# Patient Record
Sex: MALE | Race: WHITE | URBAN - METROPOLITAN AREA
[De-identification: names, ages, dates, MRNs, and addresses within clinical notes are randomized per-mention and may not be internally consistent; named-entity substitution may affect disease eponyms.]

---

## 2017-10-30 RX ORDER — TRIAMTERENE/HYDROCHLOROTHIAZID 37.5-25 MG
1 TABLET ORAL DAILY
COMMUNITY

## 2017-11-06 ENCOUNTER — HOSPITAL ENCOUNTER (OUTPATIENT)
Facility: CLINIC | Age: 67
Discharge: HOME | End: 2017-11-06
Attending: ORTHOPAEDIC SURGERY | Admitting: ORTHOPAEDIC SURGERY
Payer: MEDICARE

## 2017-11-06 ENCOUNTER — ANESTHESIA EVENT (OUTPATIENT)
Dept: SURGERY | Facility: CLINIC | Age: 67
End: 2017-11-06
Payer: MEDICARE

## 2017-11-06 ENCOUNTER — ANESTHESIA (OUTPATIENT)
Dept: SURGERY | Facility: CLINIC | Age: 67
End: 2017-11-06
Payer: MEDICARE

## 2017-11-06 ENCOUNTER — APPOINTMENT (OUTPATIENT)
Dept: GENERAL RADIOLOGY | Facility: CLINIC | Age: 67
End: 2017-11-06
Attending: ORTHOPAEDIC SURGERY
Payer: MEDICARE

## 2017-11-06 VITALS
HEIGHT: 69 IN | OXYGEN SATURATION: 97 % | SYSTOLIC BLOOD PRESSURE: 117 MMHG | BODY MASS INDEX: 43.56 KG/M2 | DIASTOLIC BLOOD PRESSURE: 74 MMHG | RESPIRATION RATE: 16 BRPM | WEIGHT: 294.1 LBS | TEMPERATURE: 97.1 F

## 2017-11-06 DIAGNOSIS — Z98.890 S/P FOOT SURGERY, LEFT: Primary | ICD-10-CM

## 2017-11-06 PROCEDURE — 27210794 ZZH OR GENERAL SUPPLY STERILE: Performed by: ORTHOPAEDIC SURGERY

## 2017-11-06 PROCEDURE — 25000128 H RX IP 250 OP 636: Performed by: ORTHOPAEDIC SURGERY

## 2017-11-06 PROCEDURE — 37000008 ZZH ANESTHESIA TECHNICAL FEE, 1ST 30 MIN: Performed by: ORTHOPAEDIC SURGERY

## 2017-11-06 PROCEDURE — 36000058 ZZH SURGERY LEVEL 3 EA 15 ADDTL MIN: Performed by: ORTHOPAEDIC SURGERY

## 2017-11-06 PROCEDURE — 36000060 ZZH SURGERY LEVEL 3 W FLUORO 1ST 30 MIN: Performed by: ORTHOPAEDIC SURGERY

## 2017-11-06 PROCEDURE — 25000132 ZZH RX MED GY IP 250 OP 250 PS 637: Mod: GY | Performed by: PHYSICIAN ASSISTANT

## 2017-11-06 PROCEDURE — C1713 ANCHOR/SCREW BN/BN,TIS/BN: HCPCS | Performed by: ORTHOPAEDIC SURGERY

## 2017-11-06 PROCEDURE — 25000128 H RX IP 250 OP 636: Performed by: NURSE ANESTHETIST, CERTIFIED REGISTERED

## 2017-11-06 PROCEDURE — 27110028 ZZH OR GENERAL SUPPLY NON-STERILE: Performed by: ORTHOPAEDIC SURGERY

## 2017-11-06 PROCEDURE — 25000128 H RX IP 250 OP 636: Performed by: ANESTHESIOLOGY

## 2017-11-06 PROCEDURE — 71000012 ZZH RECOVERY PHASE 1 LEVEL 1 FIRST HR: Performed by: ORTHOPAEDIC SURGERY

## 2017-11-06 PROCEDURE — 40000277 XR SURGERY CARM FLUORO LESS THAN 5 MIN W STILLS

## 2017-11-06 PROCEDURE — 25000125 ZZHC RX 250: Performed by: NURSE ANESTHETIST, CERTIFIED REGISTERED

## 2017-11-06 PROCEDURE — C1769 GUIDE WIRE: HCPCS | Performed by: ORTHOPAEDIC SURGERY

## 2017-11-06 PROCEDURE — 71000013 ZZH RECOVERY PHASE 1 LEVEL 1 EA ADDTL HR: Performed by: ORTHOPAEDIC SURGERY

## 2017-11-06 PROCEDURE — A9270 NON-COVERED ITEM OR SERVICE: HCPCS | Mod: GY | Performed by: PHYSICIAN ASSISTANT

## 2017-11-06 PROCEDURE — 25000566 ZZH SEVOFLURANE, EA 15 MIN: Performed by: ORTHOPAEDIC SURGERY

## 2017-11-06 PROCEDURE — 37000009 ZZH ANESTHESIA TECHNICAL FEE, EACH ADDTL 15 MIN: Performed by: ORTHOPAEDIC SURGERY

## 2017-11-06 PROCEDURE — 71000027 ZZH RECOVERY PHASE 2 EACH 15 MINS: Performed by: ORTHOPAEDIC SURGERY

## 2017-11-06 PROCEDURE — 40000170 ZZH STATISTIC PRE-PROCEDURE ASSESSMENT II: Performed by: ORTHOPAEDIC SURGERY

## 2017-11-06 DEVICE — IMP SCR ARTHREX CAN 4.0X30MM AR-8940-30: Type: IMPLANTABLE DEVICE | Status: FUNCTIONAL

## 2017-11-06 DEVICE — IMP SCR ARTHREX CAN LP 6.7X45MM 18MM THRD AR-8967-1845: Type: IMPLANTABLE DEVICE | Site: ANKLE | Status: FUNCTIONAL

## 2017-11-06 DEVICE — IMP PLATE ARTHREX MIDFOOT FLAT H SM LT AR-8942L-S: Type: IMPLANTABLE DEVICE | Site: ANKLE | Status: FUNCTIONAL

## 2017-11-06 DEVICE — IMP SCR ARTHREX BLUE LOCKING 3.5X26MM AR-8935L-26: Type: IMPLANTABLE DEVICE | Site: ANKLE | Status: FUNCTIONAL

## 2017-11-06 RX ORDER — ONDANSETRON 2 MG/ML
4 INJECTION INTRAMUSCULAR; INTRAVENOUS EVERY 30 MIN PRN
Status: DISCONTINUED | OUTPATIENT
Start: 2017-11-06 | End: 2017-11-06 | Stop reason: HOSPADM

## 2017-11-06 RX ORDER — AMOXICILLIN 250 MG
1-2 CAPSULE ORAL 2 TIMES DAILY
Qty: 30 TABLET | Refills: 0 | Status: SHIPPED | OUTPATIENT
Start: 2017-11-06

## 2017-11-06 RX ORDER — LIDOCAINE HYDROCHLORIDE 20 MG/ML
INJECTION, SOLUTION INFILTRATION; PERINEURAL PRN
Status: DISCONTINUED | OUTPATIENT
Start: 2017-11-06 | End: 2017-11-06

## 2017-11-06 RX ORDER — ROPIVACAINE HYDROCHLORIDE 5 MG/ML
INJECTION, SOLUTION EPIDURAL; INFILTRATION; PERINEURAL PRN
Status: DISCONTINUED | OUTPATIENT
Start: 2017-11-06 | End: 2017-11-06

## 2017-11-06 RX ORDER — PROPOFOL 10 MG/ML
INJECTION, EMULSION INTRAVENOUS CONTINUOUS PRN
Status: DISCONTINUED | OUTPATIENT
Start: 2017-11-06 | End: 2017-11-06

## 2017-11-06 RX ORDER — FENTANYL CITRATE 50 UG/ML
25-50 INJECTION, SOLUTION INTRAMUSCULAR; INTRAVENOUS EVERY 5 MIN PRN
Status: DISCONTINUED | OUTPATIENT
Start: 2017-11-06 | End: 2017-11-06 | Stop reason: HOSPADM

## 2017-11-06 RX ORDER — PHYSOSTIGMINE SALICYLATE 1 MG/ML
1.2 INJECTION INTRAVENOUS
Status: DISCONTINUED | OUTPATIENT
Start: 2017-11-06 | End: 2017-11-06 | Stop reason: HOSPADM

## 2017-11-06 RX ORDER — ONDANSETRON 2 MG/ML
INJECTION INTRAMUSCULAR; INTRAVENOUS PRN
Status: DISCONTINUED | OUTPATIENT
Start: 2017-11-06 | End: 2017-11-06

## 2017-11-06 RX ORDER — CEFAZOLIN SODIUM 1 G/50ML
3 SOLUTION INTRAVENOUS
Status: COMPLETED | OUTPATIENT
Start: 2017-11-06 | End: 2017-11-06

## 2017-11-06 RX ORDER — OXYCODONE AND ACETAMINOPHEN 5; 325 MG/1; MG/1
1-2 TABLET ORAL
Status: COMPLETED | OUTPATIENT
Start: 2017-11-06 | End: 2017-11-06

## 2017-11-06 RX ORDER — CEFAZOLIN SODIUM 1 G/3ML
1 INJECTION, POWDER, FOR SOLUTION INTRAMUSCULAR; INTRAVENOUS SEE ADMIN INSTRUCTIONS
Status: DISCONTINUED | OUTPATIENT
Start: 2017-11-06 | End: 2017-11-06 | Stop reason: HOSPADM

## 2017-11-06 RX ORDER — CEFAZOLIN SODIUM 2 G/100ML
2 INJECTION, SOLUTION INTRAVENOUS
Status: DISCONTINUED | OUTPATIENT
Start: 2017-11-06 | End: 2017-11-06 | Stop reason: DRUGHIGH

## 2017-11-06 RX ORDER — FENTANYL CITRATE 50 UG/ML
25-50 INJECTION, SOLUTION INTRAMUSCULAR; INTRAVENOUS
Status: DISCONTINUED | OUTPATIENT
Start: 2017-11-06 | End: 2017-11-06 | Stop reason: HOSPADM

## 2017-11-06 RX ORDER — PROPOFOL 10 MG/ML
INJECTION, EMULSION INTRAVENOUS PRN
Status: DISCONTINUED | OUTPATIENT
Start: 2017-11-06 | End: 2017-11-06

## 2017-11-06 RX ORDER — FENTANYL CITRATE 50 UG/ML
INJECTION, SOLUTION INTRAMUSCULAR; INTRAVENOUS PRN
Status: DISCONTINUED | OUTPATIENT
Start: 2017-11-06 | End: 2017-11-06

## 2017-11-06 RX ORDER — ONDANSETRON 4 MG/1
4 TABLET, ORALLY DISINTEGRATING ORAL
Status: DISCONTINUED | OUTPATIENT
Start: 2017-11-06 | End: 2017-11-06 | Stop reason: HOSPADM

## 2017-11-06 RX ORDER — ONDANSETRON 4 MG/1
4 TABLET, ORALLY DISINTEGRATING ORAL EVERY 30 MIN PRN
Status: DISCONTINUED | OUTPATIENT
Start: 2017-11-06 | End: 2017-11-06 | Stop reason: HOSPADM

## 2017-11-06 RX ORDER — SODIUM CHLORIDE, SODIUM LACTATE, POTASSIUM CHLORIDE, CALCIUM CHLORIDE 600; 310; 30; 20 MG/100ML; MG/100ML; MG/100ML; MG/100ML
INJECTION, SOLUTION INTRAVENOUS CONTINUOUS
Status: DISCONTINUED | OUTPATIENT
Start: 2017-11-06 | End: 2017-11-06 | Stop reason: HOSPADM

## 2017-11-06 RX ORDER — OXYCODONE AND ACETAMINOPHEN 5; 325 MG/1; MG/1
1-2 TABLET ORAL EVERY 4 HOURS PRN
Qty: 50 TABLET | Refills: 0 | Status: SHIPPED | OUTPATIENT
Start: 2017-11-06

## 2017-11-06 RX ORDER — MEPERIDINE HYDROCHLORIDE 25 MG/ML
12.5 INJECTION INTRAMUSCULAR; INTRAVENOUS; SUBCUTANEOUS
Status: DISCONTINUED | OUTPATIENT
Start: 2017-11-06 | End: 2017-11-06 | Stop reason: HOSPADM

## 2017-11-06 RX ORDER — HYDROMORPHONE HYDROCHLORIDE 1 MG/ML
.3-.5 INJECTION, SOLUTION INTRAMUSCULAR; INTRAVENOUS; SUBCUTANEOUS EVERY 10 MIN PRN
Status: DISCONTINUED | OUTPATIENT
Start: 2017-11-06 | End: 2017-11-06 | Stop reason: HOSPADM

## 2017-11-06 RX ORDER — SODIUM CHLORIDE, SODIUM LACTATE, POTASSIUM CHLORIDE, CALCIUM CHLORIDE 600; 310; 30; 20 MG/100ML; MG/100ML; MG/100ML; MG/100ML
INJECTION, SOLUTION INTRAVENOUS CONTINUOUS PRN
Status: DISCONTINUED | OUTPATIENT
Start: 2017-11-06 | End: 2017-11-06

## 2017-11-06 RX ORDER — DEXAMETHASONE SODIUM PHOSPHATE 4 MG/ML
INJECTION, SOLUTION INTRA-ARTICULAR; INTRALESIONAL; INTRAMUSCULAR; INTRAVENOUS; SOFT TISSUE PRN
Status: DISCONTINUED | OUTPATIENT
Start: 2017-11-06 | End: 2017-11-06

## 2017-11-06 RX ORDER — NALOXONE HYDROCHLORIDE 0.4 MG/ML
.1-.4 INJECTION, SOLUTION INTRAMUSCULAR; INTRAVENOUS; SUBCUTANEOUS
Status: DISCONTINUED | OUTPATIENT
Start: 2017-11-06 | End: 2017-11-06 | Stop reason: HOSPADM

## 2017-11-06 RX ADMIN — PHENYLEPHRINE HYDROCHLORIDE 100 MCG: 10 INJECTION INTRAVENOUS at 11:10

## 2017-11-06 RX ADMIN — DEXAMETHASONE SODIUM PHOSPHATE 4 MG: 4 INJECTION, SOLUTION INTRA-ARTICULAR; INTRALESIONAL; INTRAMUSCULAR; INTRAVENOUS; SOFT TISSUE at 10:37

## 2017-11-06 RX ADMIN — PROPOFOL 200 MCG/KG/MIN: 10 INJECTION, EMULSION INTRAVENOUS at 10:33

## 2017-11-06 RX ADMIN — FENTANYL CITRATE 50 MCG: 50 INJECTION, SOLUTION INTRAMUSCULAR; INTRAVENOUS at 10:33

## 2017-11-06 RX ADMIN — FENTANYL CITRATE 50 MCG: 50 INJECTION, SOLUTION INTRAMUSCULAR; INTRAVENOUS at 11:55

## 2017-11-06 RX ADMIN — DEXMEDETOMIDINE HYDROCHLORIDE 8 MCG: 100 INJECTION, SOLUTION INTRAVENOUS at 10:39

## 2017-11-06 RX ADMIN — PROPOFOL 200 MG: 10 INJECTION, EMULSION INTRAVENOUS at 10:33

## 2017-11-06 RX ADMIN — PHENYLEPHRINE HYDROCHLORIDE 100 MCG: 10 INJECTION INTRAVENOUS at 11:07

## 2017-11-06 RX ADMIN — DEXMEDETOMIDINE HYDROCHLORIDE 4 MCG: 100 INJECTION, SOLUTION INTRAVENOUS at 10:46

## 2017-11-06 RX ADMIN — LIDOCAINE HYDROCHLORIDE 80 MG: 20 INJECTION, SOLUTION INFILTRATION; PERINEURAL at 10:33

## 2017-11-06 RX ADMIN — FENTANYL CITRATE 50 MCG: 50 INJECTION, SOLUTION INTRAMUSCULAR; INTRAVENOUS at 12:12

## 2017-11-06 RX ADMIN — SODIUM CHLORIDE, POTASSIUM CHLORIDE, SODIUM LACTATE AND CALCIUM CHLORIDE: 600; 310; 30; 20 INJECTION, SOLUTION INTRAVENOUS at 10:33

## 2017-11-06 RX ADMIN — ROPIVACAINE HYDROCHLORIDE 30 ML: 5 INJECTION, SOLUTION EPIDURAL; INFILTRATION; PERINEURAL at 10:37

## 2017-11-06 RX ADMIN — PHENYLEPHRINE HYDROCHLORIDE 100 MCG: 10 INJECTION INTRAVENOUS at 10:55

## 2017-11-06 RX ADMIN — PHENYLEPHRINE HYDROCHLORIDE 100 MCG: 10 INJECTION INTRAVENOUS at 11:01

## 2017-11-06 RX ADMIN — ONDANSETRON 4 MG: 2 INJECTION INTRAMUSCULAR; INTRAVENOUS at 10:37

## 2017-11-06 RX ADMIN — PHENYLEPHRINE HYDROCHLORIDE 100 MCG: 10 INJECTION INTRAVENOUS at 10:42

## 2017-11-06 RX ADMIN — MIDAZOLAM HYDROCHLORIDE 2 MG: 1 INJECTION, SOLUTION INTRAMUSCULAR; INTRAVENOUS at 10:33

## 2017-11-06 RX ADMIN — Medication 3 G: at 10:36

## 2017-11-06 RX ADMIN — OXYCODONE HYDROCHLORIDE AND ACETAMINOPHEN 1 TABLET: 5; 325 TABLET ORAL at 12:35

## 2017-11-06 NOTE — IP AVS SNAPSHOT
MRN:[id number]                      After Visit Summary   11/6/2017    Jeremiah Poe    MRN: [id number]           Thank you!     Thank you for choosing [facility] for your care. Our goal is always to provide you with excellent care. Hearing back from our patients is one way we can continue to improve our services. Please take a few minutes to complete the written survey that you may receive in the mail after you visit with us. Thank you!        Patient Information     Date Of Birth          1950        About your hospital stay     You were admitted on:  November 6, 2017 You last received care in the:  [facility]    You were discharged on:  November 6, 2017       Who to Call     For medical emergencies, please call 911.  For non-urgent questions about your medical care, please call your primary care provider or clinic, [phone]  For questions related to your surgery, please call your surgery clinic        Attending Provider     Provider Ramón Orellana MD Orthopaedic Surgery       Primary Care Provider Office Phone # Fax #    Marco Moe [phone]      After Care Instructions     Discharge Instructions       Review discharge instructions as directed by Provider.            Discharge Instructions       Patient to arrange for return to clinic appointment in two weeks.            Elevate affected extremity           Ice to affected area       Ice pack to affected area PRN (as needed).            No dressing change       until follow up clinic appointment.            Weight bearing status - Foot flat                 Further instructions from your care team       If your bladder area becomes painful and distended and you are unable to urinate then call Dr Reed's office and report to hospital Emergency Room    Same Day Surgery Discharge Instructions for  Sedation and General Anesthesia       It's not unusual to feel dizzy,  light-headed or faint for up to 24 hours after surgery or while taking pain medication.  If you have these symptoms: sit for a few minutes before standing and have someone assist you when you get up to walk or use the bathroom.      You should rest and relax for the next 24 hours. We recommend you make arrangements to have an adult stay with you for at least 24 hours after your discharge.  Avoid hazardous and strenuous activity.      DO NOT DRIVE any vehicle or operate mechanical equipment for 24 hours following the end of your surgery.  Even though you may feel normal, your reactions may be affected by the medication you have received.      Do not drink alcoholic beverages for 24 hours following surgery.       Slowly progress to your regular diet as you feel able. It's not unusual to feel nauseated and/or vomit after receiving anesthesia.  If you develop these symptoms, drink clear liquids (apple juice, ginger ale, broth, 7-up, etc. ) until you feel better.  If your nausea and vomiting persists for 24 hours, please notify your surgeon.        All narcotic pain medications, along with inactivity and anesthesia, can cause constipation. Drinking plenty of liquids and increasing fiber intake will help.      For any questions of a medical nature, call your surgeon.      Do not make important decisions for 24 hours.      If you had general anesthesia, you may have a sore throat for a couple of days related to the breathing tube used during surgery.  You may use Cepacol lozenges to help with this discomfort.  If it worsens or if you develop a fever, contact your surgeon.       If you feel your pain is not well managed with the pain medications prescribed by your surgeon, please contact your surgeon's office to let them know so they can address your concerns.     **Because you had anesthesia today and your history of sleep apnea, it is extremely important that you use your CPAP machine for the next 24 hours while napping  "or sleeping.**        [facility]      DISCHARGE INSTRUCTIONS FOLLOWING   REGIONAL BLOCK ANESTHESIA      Numbness or lack of feeling in the arm/leg that was operated may last up to 24 hours.  The average time is usually 10-15 hours.  You may not be able to lift or move the arm or leg where the operation was by itself during that time.  Long-acting local anesthetic medicines were used to give you long-lasting pain relief.    Wear a sling until your arm is completely \"awake\"    Avoid bumping your arm, leg or foot while it is numb    Avoid extremes of hot or cold while it is numb    Remain quiet and restful the day of surgery.  Resume normal activities gradually over the next day or so as advise by your surgeon.    Do not drive or operate  Any machinery until your extremity is full  \"awake\"        You will have a tingling and prickly sensation in your arm/leg as the feeling begins to return; you can also expect some discomfort. The amount of discomfort is unpredictable, but if you have more pain that can be controlled with the pain medication you received, you should contact your surgeon.  Start to take your pain pills as soon as you start to feel any discomfort or pain.  We strongly recommend starting your pain medication at bedtime if you haven't already done so.  This is in the event that the block wears off while you are asleep.       POST-OPERATIVE INSTRUCTIONS  FOOT AND ANKLE SURGERY  MARYA Reed M.D.  ANTHONY [email or web address].[email or web address]    These precautions MUST be followed for the first 24 hours after surgery:    Upon discharge, go directly home.    You must make arrangements to have a responsible adult stay with you.    DO NOT DRINK ALCOHOLIC BEVERAGES    It is not unusual to feel lightheaded up to 24 hours after surgery or while taking pain medication.  If you feel lightheaded, sit for a few minutes before standing and have someone assisted you when you get up to walk or use the restroom.    Do not use " any mechanical equipment.    Do not make any important or legal decisions for 24 hours or while on pain medications.    You may experience dry mouth, sore throat, or sleep disturbances from the anesthesia and medications used during surgery.  Generalized muscle aches can sometimes occur.  These usually disappear in 12-24 hours.    POST-OPERATIVE CARE GUIDELINES    The following are general guidelines about what to expect the first days/weeks after surgery.  They are not specific, and your recovery may be slightly different.    Blood clots are not common, but are emergencies.  If you have sudden onset pain in your calf or leg, or have sudden shortness of breath, go to the Emergency Department.      Elevation of your operative foot/ankle is key to reducing the swelling in the immediate post-operative phase (first 3-5 days).  When you are at rest, elevate your foot at or above the level of your heart.  When sitting, your foot should be elevated on a chair or stool; not hanging down.      You should plan to rest the day after surgery no matter how minor the procedure.  More complicated procedures will require more time to return to normal activity.      Foot and ankle surgery is painful in most cases - use your medication as directed for the first 24 hours after surgery.  It is not unusual for the pain to be worse a day or two after surgery than on the day of.  If your pain is more than 8/10 contact our office.  If you don t have pain, gradually decrease your pain meds by substituting Tylenol.  Please don t use Advil/ibuprofen unless we order it for you.      You will be prescribed Percocet or Vicodin for pain, Vistaril for spasms/pain adjunct, Senokot for constipation.  If you have had trouble taking these meds before or experience nausea or vomiting after surgery from your medication, please advise the recovery room nurses.  If you are already at home, try drinking only clear liquids and/or call our office.      All  pain medications, along with inactivity can cause constipation.  Use the Senakot as directed, increase fluid intake to 1 quart per day and increase your dietary fiber.  (The  P  fruits - peaches, plums, pears, and prunes as well as anise/black licorice are recommended.)    It is not unusual to run a low-grade fever after surgery.  If your temperature is elevated above 102 , lasts longer than 24 hours, or is questionable in any way, contact our office.      Drainage from your cast/dressing is normal.  Reinforce your cast/dressing with 4x4 dressings and cover with an Ace wrap.  Wait until 24 hours after surgery to change your dressings; by this time most of your bleeding will have stopped.  If you have drainage through your dressings 2 days after surgery, contact our office.      You cast/dressing will be changed at your 2-week follow up appointment.  They should be kept clean and DRY.  If your cast/dressing is damaged before that, contact our office.      It is normal to experience some bruising in the toes after surgery and they may appear  dark  when your foot hangs down.  It is important to actively wiggle your toes for at least 5-10 minutes each hour UNLESS you had surgery on those toes.      Use ice on your foot/ankle over the dressing/cast for 20 minutes per hour, 10 or more times per day.  A large bag of frozen peas works well.      Bathing: take a tub or sponge bath instead of a shower if possible during the first two weeks.  If you choose to shower, cover the dressing/cast with a waterproof covering, these may be ordered from www.seal-tight.com or are available at some pharmacies/medical supply stores.  Another option is XeroSox, which is the original vacuum sealed bandage and cast cover.  The cast cover has a vacuum seal and is absolutely waterproof.  [phone] or www.xerosox.com for more information.      Driving:  For surgery on the left foot/ankle, you may drive as soon as narcotic medications are  stopped.  For surgery on the right foot/ankle, you may drive when you are out of a cast, off pain medications, and you feel secure with braking.      In general, listen to your foot/ankle.  If you have a sudden, dramatic increase in pain that does not resolve after an hour or so, something is wrong - call our office.  If you fall or bump your foot/ankle and have sudden pain that resolves, give it 24 hours before you call.      Many of your questions can be addressed at your 2-week follow-up appointment - please make a list of things to ask us as they come up during your recovery.      If you had a nerve block it is common to have numbness in your leg and foot for up to 30 hours after surgery.  If your leg or foot is still numb more than 30 hours after surgery, please call the office.      FUTURE DENTIST VISITS:  If you have had a total ankle arthroplasty please be advised you must be on antibiotics prior to ANY dental work.  Please call your dentist office ahead of time and make them aware of this as your dentist will be able to order the prescription.  If you have had any other surgery this is not a concern.    If you have any further questions or concerns, please call our office at [phone]       SAFETY TIPS & YOUR CRUTCHES   PRETTY Puentes   Crutch Fit     Assume good standing posture with shoulders relaxed and crutch tips 6-8 inches out from the side of the foot.       The underarm pad should fall 2-3 fingers width below the armpit.       The handgrip is positioned level with the wrist to allow 30 degree flexion at the elbow.     Safety Tips     Bear weight on your hands, not on your armpits.       Do not add extra padding to the underarm pad. This will, in effect, lengthen the crutches and increase risk of nerve injury.       Wear flat, properly fitting shoes. Do not walk in stocking feet, high heels or slippers!       Household hazards:    Throw rugs should be removed from floors.    Stairs should be cleared  "of obstacles.    Secure handrails and use them when available    Use caution on slippery, highly polished, littered or uneven floor surfaces.    Check for electric cords.       Check crutch tips for excessive wear and keep wing nuts tight.       While walking, look forward with  head up  and  eyes up.  Take equal length steps.       Use both crutches.     Stairs     Up Good leg first, followed by  bad  leg, then crutches.     Down Crutches, followed by  bad  leg, good leg.     Walking With Crutches   .   _____ Weight Bearing As Tolerated (WBAT): Move the involved leg forward. Put as much pressure through the involved leg as you can tolerate comfortably. Then step through the crutches with your uninvolved leg.       **If you have questions or concerns about your procedure,   call Dr. Reed at [phone]**      [id number]   MAY/2008                                  Pending Results     Date and Time Order Name Status Description    11/6/2017 1107 XR Surgery KENNEY Fluoro L/T 5 Min w Stills In process             Statement of Approval     Ordered          11/06/17 1125  I have reviewed and agree with all the recommendations and orders detailed in this document.  EFFECTIVE NOW     Approved and electronically signed by:  Andres Eli PA-C             Admission Information     Date & Time Provider Department Dept. Phone    11/6/2017 Ramón Reed MD [facility] Same Day Surgery [phone]      Your Vitals Were     Blood Pressure Temperature Respirations Height Weight Pulse Oximetry    117/31 97.1  F (36.2  C) (Temporal) 16 1.753 m (5' 9\") 133.4 kg (294 lb 1.6 oz) 94%    BMI (Body Mass Index)                   43.43 kg/m2           MyChar5app Information     etrigg lets you send messages to your doctor, view your test results, renew your prescriptions, schedule appointments and more. To sign up, go to [email or web address] . Click on \"Log in\" on the left side of the screen, which will take you to the " "Welcome page. Then click on \"Sign up Now\" on the right side of the page.     You will be asked to enter the access code listed below, as well as some personal information. Please follow the directions to create your username and password.     Your access code is: [email or web address]  Expires: 2018 12:34 PM     Your access code will  in 90 days. If you need help or a new code, please call your [facility] clinic or [phone].        Care EveryWhere ID     This is your Care EveryWhere ID. This could be used by other organizations to access your [facility] medical records  [id number]        Equal Access to Services     CHRISTEN HERNANDEZ : Marciano Sandoval, rand hays, mamie de la cruz, saeed fernandez . So [facility] [phone].    ATENCIÓN: Si habla español, tiene a doherty disposición servicios gratuitos de asistencia lingüística. Llame al [phone].    We comply with applicable federal civil rights laws and [place] laws. We do not discriminate on the basis of race, color, national origin, age, disability, sex, sexual orientation, or gender identity.               Review of your medicines      START taking        Dose / Directions    oxyCODONE-acetaminophen 5-325 MG per tablet   Commonly known as:  PERCOCET   Used for:  S/P foot surgery, left   Notes to Patient:  One percocet pain pill given at 12:35        Dose:  1-2 tablet   Take 1-2 tablets by mouth every 4 hours as needed for pain (moderate to severe)   Quantity:  50 tablet   Refills:  0       senna-docusate 8.6-50 MG per tablet   Commonly known as:  SENOKOT-S;PERICOLACE   Used for:  S/P foot surgery, left        Dose:  1-2 tablet   Take 1-2 tablets by mouth 2 times daily Take while on oral narcotics to prevent or treat constipation.   Quantity:  30 tablet   Refills:  0         CONTINUE these medicines which have NOT CHANGED        Dose / Directions    ASPIRIN PO        Dose:  81 mg   Take 81 mg by mouth daily "   Refills:  0       COZAAR PO        Dose:  50 mg   Take 50 mg by mouth daily   Refills:  0       DHA PO        Dose:  1 capsule   Take 1 capsule by mouth daily   Refills:  0       FLOMAX PO        Dose:  0.4 mg   Take 0.4 mg by mouth daily   Refills:  0       FOLIC ACID PO        Dose:  400 mcg   Take 400 mcg by mouth daily   Refills:  0       GARLIC PO        Dose:  1000 mg   Take 1,000 mg by mouth daily   Refills:  0       GLUCOSAMINE-CHONDROITIN PO        Dose:  1 tablet   Take 1 tablet by mouth daily   Refills:  0       MOBIC PO        Dose:  7.5-15 mg   Take 7.5-15 mg by mouth daily   Refills:  0       MULTIVITAMIN MEN PO        Dose:  1 tablet   Take 1 tablet by mouth daily   Refills:  0       NORVASC PO        Dose:  5 mg   Take 5 mg by mouth daily   Refills:  0       OMEGA 3 PO        Refills:  0       PRILOSEC PO        Dose:  40 mg   Take 40 mg by mouth daily   Refills:  0       SYNTHROID PO        Dose:  125 mcg   Take 125 mcg by mouth   Refills:  0       triamterene-hydrochlorothiazide 37.5-25 MG per tablet   Commonly known as:  MAXZIDE-25        Dose:  1 tablet   Take 1 tablet by mouth daily   Refills:  0       vitamin B complex with vitamin C Tabs tablet        Dose:  1 tablet   Take 1 tablet by mouth daily   Refills:  0       ZOCOR PO        Dose:  20 mg   Take 20 mg by mouth daily   Refills:  0            Where to get your medicines      These medications were sent to [facility] - [address]  [address], [facility] [address]     Phone:  [phone]     senna-docusate 8.6-50 MG per tablet         Some of these will need a paper prescription and others can be bought over the counter. Ask your nurse if you have questions.     Bring a paper prescription for each of these medications     oxyCODONE-acetaminophen 5-325 MG per tablet                Protect others around you: Learn how to safely use, store and throw away your medicines at  www.disposemymeds.org.             Medication List: This is a list of all your medications and when to take them. Check marks below indicate your daily home schedule. Keep this list as a reference.      Medications           Morning Afternoon Evening Bedtime As Needed    ASPIRIN PO   Take 81 mg by mouth daily                                COZAAR PO   Take 50 mg by mouth daily                                DHA PO   Take 1 capsule by mouth daily                                FLOMAX PO   Take 0.4 mg by mouth daily                                FOLIC ACID PO   Take 400 mcg by mouth daily                                GARLIC PO   Take 1,000 mg by mouth daily                                GLUCOSAMINE-CHONDROITIN PO   Take 1 tablet by mouth daily                                MOBIC PO   Take 7.5-15 mg by mouth daily                                MULTIVITAMIN MEN PO   Take 1 tablet by mouth daily                                NORVASC PO   Take 5 mg by mouth daily                                OMEGA 3 PO                                oxyCODONE-acetaminophen 5-325 MG per tablet   Commonly known as:  PERCOCET   Take 1-2 tablets by mouth every 4 hours as needed for pain (moderate to severe)   Last time this was given:  1 tablet on 11/6/2017 12:35 PM   Notes to Patient:  One percocet pain pill given at 12:35                                PRILOSEC PO   Take 40 mg by mouth daily                                senna-docusate 8.6-50 MG per tablet   Commonly known as:  SENOKOT-S;PERICOLACE   Take 1-2 tablets by mouth 2 times daily Take while on oral narcotics to prevent or treat constipation.                                SYNTHROID PO   Take 125 mcg by mouth                                triamterene-hydrochlorothiazide 37.5-25 MG per tablet   Commonly known as:  MAXZIDE-25   Take 1 tablet by mouth daily                                vitamin B complex with vitamin C Tabs tablet   Take 1 tablet by mouth daily                                 ZOCOR PO   Take 20 mg by mouth daily

## 2017-11-06 NOTE — ANESTHESIA CARE TRANSFER NOTE
Patient: Jeremiah Poe    Procedure(s):  LEFT TALONAVICULAR FUSION (C-ARM, H PLATES) - Wound Class: I-Clean    Diagnosis: DJD  Diagnosis Additional Information: No value filed.    Anesthesia Type:   General, LMA, Periph. Nerve Block for postop pain     Note:  Airway :LMA  Patient transferred to:PACU  Comments: Patient spont ventilating. Adequate TV's. To PACU with LMA. LMA removed upon arrival. Vitals stable. Report given to RN.Handoff Report: Identifed the Patient, Identified the Reponsible Provider, Reviewed the pertinent medical history, Discussed the surgical course, Reviewed Intra-OP anesthesia mangement and issues during anesthesia, Set expectations for post-procedure period and Allowed opportunity for questions and acknowledgement of understanding      Vitals: (Last set prior to Anesthesia Care Transfer)    CRNA VITALS  11/6/2017 1050 - 11/6/2017 1128      11/6/2017             Pulse: 68    SpO2: 96 %    Resp Rate (observed): 12                Electronically Signed By: STEPHANIE Hoff CRNA  November 6, 2017  11:28 AM

## 2017-11-06 NOTE — OP NOTE
DATE OF PROCEDURE:  11/06/2017      PREOPERATIVE DIAGNOSIS:  Advanced degenerative changes of the left talonavicular joint.      POSTOPERATIVE DIAGNOSIS:  Advanced degenerative changes of the left talonavicular joint.      PROCEDURE:  Left talonavicular fusion.      SURGEON:  Ramón Reed MD      ASSISTANT:   DEEPAK VÁSQUEZ      ANESTHESIA:  General.      PREAMBLE:  Mr. Jeremiah Poe presented with advanced degenerative changes of his left talonavicular joint.  He tried conservative management to no avail.  Informed consent was obtained for the above-mentioned procedures.      Two grams of Ancef was given prior to surgery.  There is no need for postoperative antibiotic prophylaxis.      An assistant was present and was needed to help with retraction of the wounds and protection of the joints.      DESCRIPTION OF PROCEDURE:  After adequate induction of a general anesthetic, the patient was positioned supine on the operating table.  The left leg was sterilely prepped and free draped in the usual fashion.  Tourniquet around the thigh was inflated to 300 mmHg.  A 6 cm longitudinal incision was made along the dorsal medial border of the foot over the talonavicular joint.  The neurovascular structures were protected as was the tibialis anterior tendon.  There were large osteophytes around the talonavicular joint that were removed.  Power osteotomes were then used to remove the remnants of articular cartilage from the opposing surfaces.      With the joint adequately prepared, it was reduced and immobilized with a large fragment screw from the medial border of the foot.  This was then followed by a dorsal H plate with 4 screws with excellent compression across the joint.      The tourniquet was deflated.  Hemostasis obtained.  The wounds closed in layers.  A sterile dressing and a light compressive bandage were applied.  He tolerated the procedure well and was taken to the recovery room in satisfactory condition.       He can ambulate toe-touch weightbearing with crutches.  Sutures will be removed in 2 weeks.         SMITH NARVAEZ MD             D: 2017 11:16   T: 2017 15:18   MT: MARITZA#[id number]      Name:     YUNIEL WOLFE   MRN:      [id number]        Account:        [id number]   :      1950           Procedure Date: 2017      Document: [id number]

## 2017-11-06 NOTE — DISCHARGE INSTRUCTIONS
If your bladder area becomes painful and distended and you are unable to urinate then call Dr Reed's office and report to hospital Emergency Room    Same Day Surgery Discharge Instructions for  Sedation and General Anesthesia       It's not unusual to feel dizzy, light-headed or faint for up to 24 hours after surgery or while taking pain medication.  If you have these symptoms: sit for a few minutes before standing and have someone assist you when you get up to walk or use the bathroom.      You should rest and relax for the next 24 hours. We recommend you make arrangements to have an adult stay with you for at least 24 hours after your discharge.  Avoid hazardous and strenuous activity.      DO NOT DRIVE any vehicle or operate mechanical equipment for 24 hours following the end of your surgery.  Even though you may feel normal, your reactions may be affected by the medication you have received.      Do not drink alcoholic beverages for 24 hours following surgery.       Slowly progress to your regular diet as you feel able. It's not unusual to feel nauseated and/or vomit after receiving anesthesia.  If you develop these symptoms, drink clear liquids (apple juice, ginger ale, broth, 7-up, etc. ) until you feel better.  If your nausea and vomiting persists for 24 hours, please notify your surgeon.        All narcotic pain medications, along with inactivity and anesthesia, can cause constipation. Drinking plenty of liquids and increasing fiber intake will help.      For any questions of a medical nature, call your surgeon.      Do not make important decisions for 24 hours.      If you had general anesthesia, you may have a sore throat for a couple of days related to the breathing tube used during surgery.  You may use Cepacol lozenges to help with this discomfort.  If it worsens or if you develop a fever, contact your surgeon.       If you feel your pain is not well managed with the pain medications prescribed by  "your surgeon, please contact your surgeon's office to let them know so they can address your concerns.     **Because you had anesthesia today and your history of sleep apnea, it is extremely important that you use your CPAP machine for the next 24 hours while napping or sleeping.**        [facility]      DISCHARGE INSTRUCTIONS FOLLOWING   REGIONAL BLOCK ANESTHESIA      Numbness or lack of feeling in the arm/leg that was operated may last up to 24 hours.  The average time is usually 10-15 hours.  You may not be able to lift or move the arm or leg where the operation was by itself during that time.  Long-acting local anesthetic medicines were used to give you long-lasting pain relief.    Wear a sling until your arm is completely \"awake\"    Avoid bumping your arm, leg or foot while it is numb    Avoid extremes of hot or cold while it is numb    Remain quiet and restful the day of surgery.  Resume normal activities gradually over the next day or so as advise by your surgeon.    Do not drive or operate  Any machinery until your extremity is full  \"awake\"        You will have a tingling and prickly sensation in your arm/leg as the feeling begins to return; you can also expect some discomfort. The amount of discomfort is unpredictable, but if you have more pain that can be controlled with the pain medication you received, you should contact your surgeon.  Start to take your pain pills as soon as you start to feel any discomfort or pain.  We strongly recommend starting your pain medication at bedtime if you haven't already done so.  This is in the event that the block wears off while you are asleep.       POST-OPERATIVE INSTRUCTIONS  FOOT AND ANKLE SURGERY  MARYA Reed M.D.  Lv Eli P.A.-C    These precautions MUST be followed for the first 24 hours after surgery:    Upon discharge, go directly home.    You must make arrangements to have a responsible adult stay with you.    DO NOT DRINK ALCOHOLIC " BEVERAGES    It is not unusual to feel lightheaded up to 24 hours after surgery or while taking pain medication.  If you feel lightheaded, sit for a few minutes before standing and have someone assisted you when you get up to walk or use the restroom.    Do not use any mechanical equipment.    Do not make any important or legal decisions for 24 hours or while on pain medications.    You may experience dry mouth, sore throat, or sleep disturbances from the anesthesia and medications used during surgery.  Generalized muscle aches can sometimes occur.  These usually disappear in 12-24 hours.    POST-OPERATIVE CARE GUIDELINES    The following are general guidelines about what to expect the first days/weeks after surgery.  They are not specific, and your recovery may be slightly different.    Blood clots are not common, but are emergencies.  If you have sudden onset pain in your calf or leg, or have sudden shortness of breath, go to the Emergency Department.      Elevation of your operative foot/ankle is key to reducing the swelling in the immediate post-operative phase (first 3-5 days).  When you are at rest, elevate your foot at or above the level of your heart.  When sitting, your foot should be elevated on a chair or stool; not hanging down.      You should plan to rest the day after surgery no matter how minor the procedure.  More complicated procedures will require more time to return to normal activity.      Foot and ankle surgery is painful in most cases - use your medication as directed for the first 24 hours after surgery.  It is not unusual for the pain to be worse a day or two after surgery than on the day of.  If your pain is more than 8/10 contact our office.  If you don t have pain, gradually decrease your pain meds by substituting Tylenol.  Please don t use Advil/ibuprofen unless we order it for you.      You will be prescribed Percocet or Vicodin for pain, Vistaril for spasms/pain adjunct, Senokot for  constipation.  If you have had trouble taking these meds before or experience nausea or vomiting after surgery from your medication, please advise the recovery room nurses.  If you are already at home, try drinking only clear liquids and/or call our office.      All pain medications, along with inactivity can cause constipation.  Use the Senakot as directed, increase fluid intake to 1 quart per day and increase your dietary fiber.  (The  P  fruits - peaches, plums, pears, and prunes as well as anise/black licorice are recommended.)    It is not unusual to run a low-grade fever after surgery.  If your temperature is elevated above 102 , lasts longer than 24 hours, or is questionable in any way, contact our office.      Drainage from your cast/dressing is normal.  Reinforce your cast/dressing with 4x4 dressings and cover with an Ace wrap.  Wait until 24 hours after surgery to change your dressings; by this time most of your bleeding will have stopped.  If you have drainage through your dressings 2 days after surgery, contact our office.      You cast/dressing will be changed at your 2-week follow up appointment.  They should be kept clean and DRY.  If your cast/dressing is damaged before that, contact our office.      It is normal to experience some bruising in the toes after surgery and they may appear  dark  when your foot hangs down.  It is important to actively wiggle your toes for at least 5-10 minutes each hour UNLESS you had surgery on those toes.      Use ice on your foot/ankle over the dressing/cast for 20 minutes per hour, 10 or more times per day.  A large bag of frozen peas works well.      Bathing: take a tub or sponge bath instead of a shower if possible during the first two weeks.  If you choose to shower, cover the dressing/cast with a waterproof covering, these may be ordered from www.seal-tight.com or are available at some pharmacies/medical supply stores.  Another option is XeroSox, which is the  original vacuum sealed bandage and cast cover.  The cast cover has a vacuum seal and is absolutely waterproof.  9-777-294-2944 or www.[employer] for more information.      Driving:  For surgery on the left foot/ankle, you may drive as soon as narcotic medications are stopped.  For surgery on the right foot/ankle, you may drive when you are out of a cast, off pain medications, and you feel secure with braking.      In general, listen to your foot/ankle.  If you have a sudden, dramatic increase in pain that does not resolve after an hour or so, something is wrong - call our office.  If you fall or bump your foot/ankle and have sudden pain that resolves, give it 24 hours before you call.      Many of your questions can be addressed at your 2-week follow-up appointment - please make a list of things to ask us as they come up during your recovery.      If you had a nerve block it is common to have numbness in your leg and foot for up to 30 hours after surgery.  If your leg or foot is still numb more than 30 hours after surgery, please call the office.      FUTURE DENTIST VISITS:  If you have had a total ankle arthroplasty please be advised you must be on antibiotics prior to ANY dental work.  Please call your dentist office ahead of time and make them aware of this as your dentist will be able to order the prescription.  If you have had any other surgery this is not a concern.    If you have any further questions or concerns, please call our office at [phone]       SAFETY TIPS & YOUR CRUTCHES   PRETTY Puentes   Crutch Fit     Assume good standing posture with shoulders relaxed and crutch tips 6-8 inches out from the side of the foot.       The underarm pad should fall 2-3 fingers width below the armpit.       The handgrip is positioned level with the wrist to allow 30 degree flexion at the elbow.     Safety Tips     Bear weight on your hands, not on your armpits.       Do not add extra padding to the underarm pad.  This will, in effect, lengthen the crutches and increase risk of nerve injury.       Wear flat, properly fitting shoes. Do not walk in stocking feet, high heels or slippers!       Household hazards:    Throw rugs should be removed from floors.    Stairs should be cleared of obstacles.    Secure handrails and use them when available    Use caution on slippery, highly polished, littered or uneven floor surfaces.    Check for electric cords.       Check crutch tips for excessive wear and keep wing nuts tight.       While walking, look forward with  head up  and  eyes up.  Take equal length steps.       Use both crutches.     Stairs     Up Good leg first, followed by  bad  leg, then crutches.     Down Crutches, followed by  bad  leg, good leg.     Walking With Crutches   .   _____ Weight Bearing As Tolerated (WBAT): Move the involved leg forward. Put as much pressure through the involved leg as you can tolerate comfortably. Then step through the crutches with your uninvolved leg.       **If you have questions or concerns about your procedure,   call Dr. Reed at [phone]**      [id number]   MAY/2008

## 2017-11-06 NOTE — ANESTHESIA PROCEDURE NOTES
Peripheral nerve/Neuraxial procedure note : Ankle  Pre-Procedure  Performed by MONA MARS  Location:       .   Procedure Documentation    .    Procedure:    Ankle.     .  .             1037 Left ankle block for pain relief at surgeon's request; 25GA, 30cc 1/2% Ropivicaine; sural nerve blocked

## 2017-11-06 NOTE — ANESTHESIA POSTPROCEDURE EVALUATION
Patient: Jeremiah Poe    Procedure(s):  LEFT TALONAVICULAR FUSION (C-ARM, H PLATES) - Wound Class: I-Clean    Diagnosis:DJD  Diagnosis Additional Information: No value filed.    Anesthesia Type:  General, LMA, Periph. Nerve Block for postop pain    Note:  Anesthesia Post Evaluation    Patient location during evaluation: PACU  Patient participation: Able to fully participate in evaluation  Level of consciousness: awake  Pain management: adequate  Airway patency: patent  Cardiovascular status: acceptable  Respiratory status: acceptable  Hydration status: acceptable  PONV: none     Anesthetic complications: None          Last vitals:  Vitals:    11/06/17 1200 11/06/17 1215 11/06/17 1230   BP: 109/75 102/69 (!) 117/31   Resp: 18 13 16   Temp: 35.8  C (96.4  F) 35.7  C (96.3  F) 36.2  C (97.1  F)   SpO2: 90% 95% 94%         Electronically Signed By: Marco Delong MD  November 6, 2017  1:18 PM

## 2017-11-06 NOTE — ANESTHESIA PREPROCEDURE EVALUATION
Anesthesia Evaluation     . Pt has had prior anesthetic.     No history of anesthetic complications          ROS/MED HX    ENT/Pulmonary:     (+)sleep apnea, uses CPAP , . .    Neurologic:       Cardiovascular:     (+) hypertension----. : . . . :. .       METS/Exercise Tolerance:     Hematologic:         Musculoskeletal:         GI/Hepatic:     (+) GERD Asymptomatic on medication,       Renal/Genitourinary:         Endo:     (+) Obesity (BMI 44), .      Psychiatric:         Infectious Disease:         Malignancy:         Other:                     Physical Exam  Normal systems: cardiovascular and pulmonary    Airway   Mallampati: II  TM distance: >3 FB  Neck ROM: limited    Dental     Cardiovascular       Pulmonary                     Anesthesia Plan      History & Physical Review  History and physical reviewed and following examination; no interval change.    ASA Status:  3 .    NPO Status:  > 8 hours    Plan for General, LMA and Periph. Nerve Block for postop pain with Intravenous and Propofol induction. Maintenance will be TIVA.    PONV prophylaxis:  Ondansetron (or other 5HT-3) and Dexamethasone or Solumedrol  Ankle block after induction      Postoperative Care  Postoperative pain management:  IV analgesics, Oral pain medications and Peripheral nerve block (Single Shot).      Consents  Anesthetic plan, risks, benefits and alternatives discussed with:  Patient..                          .  DPreop diagnosis: DJD  Procedure(s):  ARTHRODESIS ANKLE  Allergies   Allergen Reactions     Enalapril        No current facility-administered medications on file prior to encounter.   No current outpatient prescriptions on file prior to encounter.  No results found for: HGB, INR, POTASSIUM
